# Patient Record
Sex: MALE | Race: OTHER | HISPANIC OR LATINO | Employment: FULL TIME | URBAN - METROPOLITAN AREA
[De-identification: names, ages, dates, MRNs, and addresses within clinical notes are randomized per-mention and may not be internally consistent; named-entity substitution may affect disease eponyms.]

---

## 2021-01-01 ENCOUNTER — OFFICE VISIT (OUTPATIENT)
Dept: URGENT CARE | Age: 31
End: 2021-01-01
Payer: COMMERCIAL

## 2021-01-01 VITALS — RESPIRATION RATE: 18 BRPM | TEMPERATURE: 97.3 F | OXYGEN SATURATION: 99 % | HEART RATE: 104 BPM

## 2021-01-01 DIAGNOSIS — J06.9 VIRAL URI: Primary | ICD-10-CM

## 2021-01-01 LAB
FLUAV RNA RESP QL NAA+PROBE: NEGATIVE
FLUBV RNA RESP QL NAA+PROBE: NEGATIVE
RSV RNA RESP QL NAA+PROBE: NEGATIVE
SARS-COV-2 RNA RESP QL NAA+PROBE: NEGATIVE

## 2021-01-01 PROCEDURE — 99213 OFFICE O/P EST LOW 20 MIN: CPT

## 2021-01-01 PROCEDURE — 0241U HB NFCT DS VIR RESP RNA 4 TRGT: CPT

## 2022-01-01 ENCOUNTER — APPOINTMENT (EMERGENCY)
Dept: RADIOLOGY | Facility: HOSPITAL | Age: 32
End: 2022-01-01

## 2022-01-01 ENCOUNTER — HOSPITAL ENCOUNTER (EMERGENCY)
Facility: HOSPITAL | Age: 32
End: 2022-05-12
Attending: SURGERY
Payer: COMMERCIAL

## 2022-01-01 VITALS — HEART RATE: 10 BPM

## 2022-01-01 PROCEDURE — P9016 RBC LEUKOCYTES REDUCED: HCPCS

## 2022-01-01 PROCEDURE — 96375 TX/PRO/DX INJ NEW DRUG ADDON: CPT

## 2022-01-01 PROCEDURE — 99285 EMERGENCY DEPT VISIT HI MDM: CPT | Performed by: SURGERY

## 2022-01-01 PROCEDURE — NC001 PR NO CHARGE: Performed by: SURGERY

## 2022-01-01 PROCEDURE — 99285 EMERGENCY DEPT VISIT HI MDM: CPT

## 2022-01-01 PROCEDURE — 32160 OPEN CHEST HEART MASSAGE: CPT | Performed by: SURGERY

## 2022-01-01 PROCEDURE — NC001 PR NO CHARGE: Performed by: EMERGENCY MEDICINE

## 2022-01-01 PROCEDURE — 96374 THER/PROPH/DIAG INJ IV PUSH: CPT

## 2022-01-01 RX ORDER — EPINEPHRINE 0.1 MG/ML
SYRINGE (ML) INJECTION CODE/TRAUMA/SEDATION MEDICATION
Status: COMPLETED | OUTPATIENT
Start: 2022-01-01 | End: 2022-01-01

## 2022-01-01 RX ORDER — SODIUM BICARBONATE 84 MG/ML
INJECTION, SOLUTION INTRAVENOUS CODE/TRAUMA/SEDATION MEDICATION
Status: COMPLETED | OUTPATIENT
Start: 2022-01-01 | End: 2022-01-01

## 2022-01-01 RX ADMIN — EPINEPHRINE 1 MG: 0.1 INJECTION, SOLUTION ENDOTRACHEAL; INTRACARDIAC; INTRAVENOUS at 19:27

## 2022-01-01 RX ADMIN — EPINEPHRINE 1 MG: 0.1 INJECTION, SOLUTION ENDOTRACHEAL; INTRACARDIAC; INTRAVENOUS at 19:29

## 2022-01-01 RX ADMIN — SODIUM BICARBONATE 50 MEQ: 84 INJECTION, SOLUTION INTRAVENOUS at 19:30

## 2022-01-01 RX ADMIN — SODIUM BICARBONATE 50 MEQ: 84 INJECTION, SOLUTION INTRAVENOUS at 19:15

## 2022-01-01 RX ADMIN — EPINEPHRINE 1 MG: 0.1 INJECTION, SOLUTION ENDOTRACHEAL; INTRACARDIAC; INTRAVENOUS at 19:16

## 2022-01-01 RX ADMIN — EPINEPHRINE 1 MG: 0.1 INJECTION, SOLUTION ENDOTRACHEAL; INTRACARDIAC; INTRAVENOUS at 19:23
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
## 2022-05-12 NOTE — CODE DOCUMENTATION
1913 Patient arrived by EMS in PEA  Given 3 doses of epi prior to arrival per ems down time of 30 minutes  18g L AC and IO in right tib  Vented and bagged by ems upon entering bay with [place] in place  Compressions immediately taken over by ED staff  Multiple access sites place  ED attending and trauma surgeon at bedside    Working place chest tube and opening chest
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
## 2022-05-12 NOTE — ED PROCEDURE NOTE
Procedure  Intubation    Date/Time: 5/12/2022 7:58 PM  Performed by: Surjit Palumbo DO  Authorized by: Surjit Palumbo DO     Patient location:  Other (comment) (Trauma [place])  Other Assisting Provider: Yes (comment) (Dr Darling Vela)    Consent:     Consent obtained:  Emergent situation  Universal protocol:     Patient identity confirmed:  Arm band  Pre-procedure details:     Patient status:  Unresponsive    Pretreatment medications:  None    Paralytics:  None  Indications:     Indications for intubation: respiratory failure      Indications for intubation comment:  Apnea  Procedure details:     Preoxygenation:  Bag valve mask    CPR in progress: yes      Intubation method:  Oral    Oral intubation technique:  Glidescope    Laryngoscope blade:   Mac 3    Tube size (mm):  7 5    Tube type:  Cuffed    Number of attempts:  1    Ventilation between attempts: no      Cricoid pressure: yes      Tube visualized through cords: yes    Placement assessment:     ETT to lip:  22    ETT to teeth:  24    Tube secured with:  ETT marcelino    Breath sounds:  Equal    Placement verification: chest rise, condensation, colorimetric ETCO2 device, direct visualization and equal breath sounds                       Zeb Ram DO  05/12/22 2001       Zeb Ram DO  05/12/22 2045

## 2022-05-12 NOTE — DEATH NOTE
INPATIENT DEATH NOTE  Shane Phillips  32 y o  male MRN: [id number]  Unit/Bed#:  30A Encounter: [id number]             PHYSICAL EXAM:  Unresponsive to noxious stimuli, Spontaneous respirations absent, Breath sounds absent, Carotid pulse absent, Heart sounds absent, Pupillary light reflex absent and Corneal blink reflex absent    Medical Examiner notification criteria:  Patient  within 24 hours of arrival to hospital   Medical Examiner's office notified?: yes     Medical Examiner accepted case?: Coroners Case due to trauma  Name of Medical Examiner: unknown         Autopsy Options:  Coroners Case due to trauma    Primary Service Attending Physician notified?:  yes - Attending:  Victoria Dhaliwal DO    Physician/Resident responsible for completing Discharge Summary:  Darlene King MD
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
## 2022-05-13 LAB
ABO GROUP BLD BPU: NORMAL
BPU ID: NORMAL
UNIT DISPENSE STATUS: NORMAL
UNIT PRODUCT CODE: NORMAL
UNIT PRODUCT VOLUME: 275 ML
UNIT RH: NORMAL

## 2022-05-13 NOTE — ED NOTES
Patient remains pulseless, no sign of reperfusion  Time of Death called by trauma Surgeon  Dr Mark Chavez,  At [phone]       Harry Brooks RN  05/12/22 2009
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
## 2022-05-13 NOTE — ED NOTES
1922  Red packed blood cells and whole blood given with pressure bags      Taylor Brito RN  05/12/22 2006

## 2022-05-14 LAB
ABO GROUP BLD BPU: NORMAL
BPU ID: NORMAL
UNIT DISPENSE STATUS: NORMAL
UNIT PRODUCT CODE: NORMAL
UNIT PRODUCT VOLUME: 500 ML
UNIT RH: NORMAL